# Patient Record
Sex: MALE | Race: WHITE | URBAN - METROPOLITAN AREA
[De-identification: names, ages, dates, MRNs, and addresses within clinical notes are randomized per-mention and may not be internally consistent; named-entity substitution may affect disease eponyms.]

---

## 2022-01-26 ENCOUNTER — OFFICE VISIT (OUTPATIENT)
Dept: URBAN - METROPOLITAN AREA CLINIC 52 | Facility: CLINIC | Age: 37
End: 2022-01-26
Payer: COMMERCIAL

## 2022-01-26 DIAGNOSIS — G51.0 BELL'S PALSY: Primary | ICD-10-CM

## 2022-01-26 DIAGNOSIS — H16.222 KERATOCONJUNCT SICCA, NOT SPECIFIED AS SJOGREN'S, LEFT EYE: ICD-10-CM

## 2022-01-26 PROCEDURE — 92004 COMPRE OPH EXAM NEW PT 1/>: CPT | Performed by: OPTOMETRIST

## 2022-01-26 ASSESSMENT — KERATOMETRY
OS: 42.25
OD: 42.25

## 2022-01-26 ASSESSMENT — INTRAOCULAR PRESSURE
OD: 14
OS: 14

## 2022-01-26 NOTE — IMPRESSION/PLAN
Impression: Bell's palsy: G51.0. Plan: Educated patient on condition and idiopathic nature of majority of cases. (-) unilateral weakness (-) slurred speech (-) chest pain (-) headache (-) dyspnea (-) dizziness (-) disorientation (-) CN III involvement (-) CN IV involvement (-) CN VI involvement (-) decreased corneal sensation (-) Optic neuropathy/papilledema Discussed that condition is likely to spontaneously resolve after several weeks and importance of supportive therapy to limit exposure keratopathy. Patient to start PFAT Q1hr, Systane nighttime kelli QHS, Refresh Gel AT Q2hrs, Moisture chamber goggles at bedtime. RTC 6 wks f/u or sooner if patient experiences worsening of symptoms, double vision, sudden vision loss, or eye pain.

## 2022-01-26 NOTE — IMPRESSION/PLAN
Impression: Keratoconjunct sicca, not specified as Sjogren's, left eye: H16.222. Plan: 2' to [address] [place] [address] left side.  See plan #1